# Patient Record
Sex: FEMALE | Race: WHITE
[De-identification: names, ages, dates, MRNs, and addresses within clinical notes are randomized per-mention and may not be internally consistent; named-entity substitution may affect disease eponyms.]

---

## 2018-06-19 ENCOUNTER — HOSPITAL ENCOUNTER (INPATIENT)
Dept: HOSPITAL 41 - JD.OB | Age: 33
LOS: 1 days | Discharge: HOME | End: 2018-06-20
Attending: OBSTETRICS & GYNECOLOGY | Admitting: OBSTETRICS & GYNECOLOGY
Payer: COMMERCIAL

## 2018-06-19 DIAGNOSIS — Z3A.38: ICD-10-CM

## 2018-06-19 DIAGNOSIS — Z88.8: ICD-10-CM

## 2018-06-19 DIAGNOSIS — O36.5930: Primary | ICD-10-CM

## 2018-06-19 DIAGNOSIS — J45.909: ICD-10-CM

## 2018-06-19 DIAGNOSIS — Z79.899: ICD-10-CM

## 2018-06-19 DIAGNOSIS — Z87.891: ICD-10-CM

## 2018-06-19 PROCEDURE — 6A550ZT PHERESIS OF CORD BLOOD STEM CELLS, SINGLE: ICD-10-PCS | Performed by: OBSTETRICS & GYNECOLOGY

## 2018-06-19 PROCEDURE — 10907ZC DRAINAGE OF AMNIOTIC FLUID, THERAPEUTIC FROM PRODUCTS OF CONCEPTION, VIA NATURAL OR ARTIFICIAL OPENING: ICD-10-PCS | Performed by: OBSTETRICS & GYNECOLOGY

## 2018-06-19 NOTE — PCM.DEL
L & D Note





- General Info


Date of Service: 18





- Delivery Note


Labor: Induced by ARM


Delivery Outcome: Livebirth


Infant Delivery Method: Spontaneous Vaginal Delivery-Single


Infant Delivery Mode: Spontaneous


Birth Presentation: Right Occiput Anterior (ERICK)


Nuchal Cord: None


Anesthesia Type: None


Amniotic Fluid Description: Clear


Episiotomy Type: None


Laceration: None


Placenta: Intact, Spontaneous


Cord: 3 Vessels


Estimated Blood Loss: 200


Resuscitation Needed: Yes


B[place]win: Bulb Syringe, Stimulated, Warmed, [place] Used


APGAR Score 1 min: 8


APGAR Score 5 min: 8


Delivery Comments (Free Text/Narrative):: 





Shortly after AROM patient found to be complete.  With maternal pushing effort 

fetal head delivered from an ERICK presentation.  No nuchal cord present.  With 

gentle downward traction the fetal shoulders and body delivered.  Infant placed 

on maternal abdomen.  Cord clamped and cut.  Cord blood obtained. Placenta 

allowed time to separate and expelled intact.  Inspection of the perineum 

showed no lacerations 





- General Info


Date of Service: 18





- Patient Data


Vitals - Most Recent: 


 Last Vital Signs











Temp  36.9 C   18 15:24


 


Pulse  86   18 15:30


 


Resp  18   18 15:24


 


BP  107/70   18 15:30


 


Pulse Ox  98   18 15:24











Weight - Most Recent: 70.307 kg


I&O - Last 24 Hours: 


 Intake & Output











 18





 06:59 14:59 22:59


 


Intake Total   0


 


Balance   0











Lab Results Last 24 Hours: 


 Laboratory Results - last 24 hr











  18 Range/Units





  15:20 15:20 15:58 


 


WBC  11.73 H    (3.98-10.04)  K/mm3


 


RBC  4.10    (3.98-5.22)  M/mm3


 


Hgb  12.3    (11.2-15.7)  gm/L


 


Hct  35.8    (34.1-44.9)  %


 


MCV  87.3    (79.4-94.8)  fl


 


MCH  30.0    (25.6-32.2)  pg


 


MCHC  34.4    (32.2-35.5)  g/dl


 


RDW Std Deviation  41.9    (36.4-46.3)  fL


 


Plt Count  201    (182-369)  K/mm3


 


MPV  11.5    (9.4-12.3)  fl


 


POC Glucose    106 H  ()  mg/dL


 


Blood Type   B POSITIVE   


 


Gel Antibody Screen   Negative   














  18 Range/Units





  18:38 


 


WBC   (3.98-10.04)  K/mm3


 


RBC   (3.98-5.22)  M/mm3


 


Hgb   (11.2-15.7)  gm/L


 


Hct   (34.1-44.9)  %


 


MCV   (79.4-94.8)  fl


 


MCH   (25.6-32.2)  pg


 


MCHC   (32.2-35.5)  g/dl


 


RDW Std Deviation   (36.4-46.3)  fL


 


Plt Count   (182-369)  K/mm3


 


MPV   (9.4-12.3)  fl


 


POC Glucose  93  ()  mg/dL


 


Blood Type   


 


Gel Antibody Screen   











Med Orders - Current: 


 Current Medications





Ampicillin Sodium 1 gm/ Sodium (Chloride)  100 mls @ 200 mls/hr IV Q4H KESHAV


   Last Admin: 18 19:51 Dose:  200 mls/hr


Lactated Ringer's (Ringers, Lactated)  1,000 mls @ 40 mls/hr IV ASDIRECTED KESHAV


   Last Admin: 18 15:55 Dose:  40 mls/hr


Oxytocin/Lactated Ringer's (Pitocin In Lr 10 Units/1,000 Ml)  10 unit in 1,000 

mls @ 12 mls/hr IV TITRATE KESHAV; Protocol


Oxytocin/Lactated Ringer's (Pitocin In Lr 10 Units/1,000 Ml)  10 unit in 1,000 

mls @ 500 mls/hr IV .CONTINUOUS KESHAV


   Last Admin: 18 20:10 Dose:  500 mls/hr


Nalbuphine HCl (Nubain)  10 mg IVPUSH Q2H PRN


   PRN Reason: Pain (moderate 4-6)


Ondansetron HCl (Zofran)  4 mg IVPUSH Q4H PRN


   PRN Reason: Nausea/Vomiting


Sodium Chloride (Saline Flush)  10 ml FLUSH ASDIRECTED PRN


   PRN Reason: Keep Vein Open





Discontinued Medications





Ampicillin Sodium 2 gm/ Sodium (Chloride)  100 mls @ 200 mls/hr IV ONETIME ONE


   Stop: 18 15:35


   Last Admin: 18 15:55 Dose:  200 mls/hr


Lidocaine HCl (Xylocaine 1%) Confirm Administered Dose 50 ml .ROUTE .STK-MED ONE


   Stop: 18 20:03











- Problem List Review


Problem List Initiated/Reviewed/Updated: Yes





- My Orders


Last 24 Hours: 


My Active Orders





18 15:06


Patient Status [ADT] Routine 


Activity as Tolerated [RC] PFP 


Blood Glucose Check, Bedside [RC] Q2H 


Communication Order [RC] ASDIRECTED 


Fetal Monitoring [RC] INTERMITTENT 


Notify Provider [RC] ASDIRECTED 


Notify Provider [RC] PRN 


Vaginal Exam [RC] ASDIRECTED 


Nalbuphine [Nubain]   10 mg IVPUSH Q2H PRN 


Ondansetron [Zofran]   4 mg IVPUSH Q4H PRN 


Sodium Chloride 0.9% [Saline Flush]   10 ml FLUSH ASDIRECTED PRN 


Electronic Fetal Heart Tones Ext w TOCO [WOMSER] Routine 


Peripheral IV Insertion Adult [OM.PC] Routine 





18 15:07


Fetal Heart Tones [RC] ASDIRECTED 


Peripheral IV Care [RC] .AS DIRECTED 





18 15:15


Lactated Ringers [Ringers, Lactated] 1,000 ml IV ASDIRECTED 


Oxytocin/Lactated Ringers [Pitocin in LR 10 Units/1,000 ML] 10 unit in 1,000 ml 

IV .CONTINUOUS 


Oxytocin/Lactated Ringers [Pitocin in LR 10 Units/1,000 ML] 10 unit in 1,000 ml 

IV TITRATE 





18 15:20


RAPID PLASMA REAGIN,RPR [CHEM] Routine 





18 19:00


Ampicillin 1 gm   Sodium Chloride 0.9% [Normal Saline] 100 ml IV Q4H 





18 Dinner


Regular Diet [DIET] 














- Assessment


Assessment:: 





33 y/o G3 now  PPD#0 from  at 38 3/7 wks 





- Plan


Plan:: 











* Blood glucose in AM 


* Routine cares 


* Bottle feeding 


* Discharge home in 1-2 days

## 2018-06-19 NOTE — PCM.LDHP
L&D History of Present Illness





- General


Date of Service: 18


Admit Problem/Dx: 


 Patient Status Order with Admit Dx/Problem





18 15:06


Patient Status [ADT] Routine 








 Admission Diagnosis/Problem











Admission Diagnosis/Problem    High risk pregnancy














Source of Information: Patient


History Limitations: Reports: No Limitations





- History of Present Illness


Introduction:: 





Patient is a 31 y/o  woman at 38 3/7 wks who presents for IOL for IUGR 

and GODMA2.  Doing well since last seen in clinic.  Having some contractions 

today.  Rates them as a 4/10.  Reports blood sugars have been mostly good, but 

does not have them for review 





- Related Data


Allergies/Adverse Reactions: 


 Allergies











Allergy/AdvReac Type Severity Reaction Status Date / Time


 


benzoyl peroxide Allergy  Hives Verified 18 16:31


 


red dye Allergy  Hives Verified 18 15:26











Home Medications: 


 Home Meds





Acetaminophen [Tylenol] 1,000 mg PO Q4H PRN 18 [History]


Acyclovir [Zovirax 5% Oint] 0 gm TOP QID PRN 18 [History]


Albuterol [Ventolin HFA] 2 puff INH Q4H PRN 18 [History]


Cetirizine HCl [Zyrtec] 20 mg PO DAILY PRN 18 [History]


Insulin Detemir [Levemir] 10 unit SUBCUT BEDTIME 18 [History]


Insulin Lispro [Humalog] 5 unit SQ ASDIRECTED 18 [History]


valACYclovir HCl [Valtrex] 500 mg PO DAILY PRN 18 [History]











Past Medical History


Respiratory History: Reports: Asthma


Gastrointestinal History: Reports: GERD


OB/GYN History: Reports: Pregnancy


: 3


Para: 2


LMP (Approximate): Pregnant


Endocrine/Metabolic History: Reports: Diabetes, Gestational





- Past Surgical History


HEENT Surgical History: Reports: Naso-Sinus Surgery





Social & Family History





- Tobacco Use


Smoking Status *Q: Former Smoker





- Alcohol Use


Alcohol Use History: No





- Recreational Drug Use


Recreational Drug Use: No





H&P Review of Systems





- Review of Systems:


Review Of Systems: See Below


General: Reports: No Symptoms


Pulmonary: Reports: No Symptoms


Cardiovascular: Reports: No Symptoms


Gastrointestinal: Reports: No Symptoms


Genitourinary: Reports: No Symptoms


Musculoskeletal: Reports: No Symptoms


Neurological: Reports: No Symptoms





L&D Exam





- Exam


Exam: See Below





- OB Specific


Contraction Intensity: Mild to Moderate


Fetal Movement: Active


Fetal Heart Tones: Present


Fetal Heart Tones per Min: 135


Fetal Heart Rate (FHR) Variability: Moderate (6-25 bmp)


Birth Presentation: Vertex





- Veliz Score


Veliz Score Cervix Position: Midposition


Veliz Score Consistency: Soft


Veliz Score Effacement: >80%


Veliz Score Dilation: > 5 cm


Veliz Score Infant's Station: -2


Veliz Score Total: 10





- Exam


General: Alert, Oriented, Cooperative


Lungs: Clear to Auscultation, Normal Respiratory Effort


Cardiovascular: Regular Rate, Regular Rhythm


GI/Abdominal Exam: Soft, Non-Tender


Genitourinary: Normal external exam


Extremities: Normal Inspection


Skin: Warm, Dry, Intact





- Patient Data


Result Diagrams: 


 18 15:20








- Problem List


(1) 38 weeks gestation of pregnancy


SNOMED Code(s): 56176513


   ICD Code: Z3A.38 - 38 WEEKS GESTATION OF PREGNANCY   Status: Acute   Current 

Visit: Yes   





(2) IUGR (intrauterine growth restriction)


SNOMED Code(s): 97588894


   ICD Code: OEG8946 -    Status: Acute   Current Visit: Yes   





(3) Gestational diabetes


SNOMED Code(s): 77858133


   ICD Code: O24.419 - GESTATIONAL DIABETES MELLITUS IN PREGNANCY, UNSP CONTROL

   Status: Acute   Current Visit: Yes   


Qualifiers: 


   Gestational diabetes mellitus control: insulin-controlled   Trimester: third 

trimester   Qualified Code(s): O24.414 - Gestational diabetes mellitus in 

pregnancy, insulin controlled   





(4) GBS carrier


SNOMED Code(s): 2959909526521


   ICD Code: Z22.330 - CARRIER OF GROUP B STREPTOCOCCUS   Status: Acute   

Current Visit: Yes   


Problem List Initiated/Reviewed/Updated: Yes


Orders Last 24hrs: 


 Active Orders 24 hr











 Category Date Time Status


 


 Patient Status [ADT] Routine ADT  18 15:06 Ordered


 


 Activity as Tolerated [RC] PFP Care  18 15:06 Ordered


 


 Blood Glucose Check, Bedside [RC] Q2H Care  18 15:06 Ordered


 


 Communication Order [RC] ASDIRECTED Care  18 15:06 Ordered


 


 Communication Order [RC] ASDIRECTED Care  18 15:06 Ordered


 


 Communication Order [RC] ASDIRECTED Care  18 15:06 Ordered


 


 Fetal Heart Tones [RC] ASDIRECTED Care  18 15:07 Ordered


 


 Fetal Monitoring [RC] INTERMITTENT Care  18 15:06 Ordered


 


 Notify Provider [RC] ASDIRECTED Care  18 15:06 Ordered


 


 Notify Provider [RC] PRN Care  18 15:06 Ordered


 


 Peripheral IV Care [RC] .AS DIRECTED Care  18 15:07 Ordered


 


 Vaginal Exam [RC] ASDIRECTED Care  18 15:06 Ordered


 


 Vital Signs [RC] ASDIRECTED Care  18 15:06 Ordered


 


 Vital Signs [RC] PER UNIT ROUTINE Care  18 15:06 Ordered


 


 Regular Diet [DIET] Diet  18 Dinner Ordered


 


 CBC W/O DIFF,HEMOGRAM [HEME] Routine Lab  18 15:06 Ordered


 


 RAPID PLASMA REAGIN,RPR [CHEM] Routine Lab  18 15:06 Ordered


 


 TYPE AND SCREEN [BBK] Routine Lab  18 15:06 Ordered


 


 Ampicillin 1 gm Med  18 15:15 Ordered





 Sodium Chloride 0.9% [Normal Saline] 100 ml   





 IV Q4H   


 


 Ampicillin 2 gm Med  18 15:06 Ordered





 Sodium Chloride 0.9% [Normal Saline] 100 ml   





 IV ONETIME   


 


 Lactated Ringers [Ringers, Lactated] 1,000 ml Med  18 15:15 Ordered





 IV ASDIRECTED   


 


 Nalbuphine [Nubain] Med  18 15:06 Ordered





 10 mg IVPUSH Q2H PRN   


 


 Ondansetron [Zofran] Med  18 15:06 Ordered





 4 mg IVPUSH Q4H PRN   


 


 Oxytocin/Lactated Ringers [Pitocin in LR 10 Units/1,000 Med  18 15:15 

Ordered





 ML]   





 10 unit in 1,000 ml IV .CONTINUOUS   


 


 Oxytocin/Lactated Ringers [Pitocin in LR 10 Units/1,000 Med  18 15:15 

Ordered





 ML]   





 10 unit in 1,000 ml IV TITRATE   


 


 Sodium Chloride 0.9% [Saline Flush] Med  18 15:06 Ordered





 10 ml FLUSH ASDIRECTED PRN   


 


 Electronic Fetal Heart Tones Ext w TOCO [WOMSER] Oth  18 15:06 Ordered





 Routine   


 


 Peripheral IV Insertion Adult [OM.PC] Routine Oth  18 15:06 Ordered











Assessment/Plan Comment:: 





31 y/o  at 38 3/7 wks 





* CBC and T&S


* Blood sugars q2 hrs 


* Given already 4-5 cm (likely early labor) will only start Ampicillin for GBS 

prophylaxis and then AROM after 2nd dose. 


* Pain management per patient preference 


* Anticipate

## 2018-06-20 NOTE — PCM.PNPP
- General Info


Date of Service: 18


Functional Status: Reports: Pain Controlled, Tolerating Diet, Ambulating, 

Urinating





- Review of Systems


General: Reports: No Symptoms


Pulmonary: Reports: No Symptoms


Cardiovascular: Reports: No Symptoms


Gastrointestinal: Reports: No Symptoms


Genitourinary: Reports: No Symptoms


Musculoskeletal: Reports: No Symptoms


Neurological: Reports: No Symptoms





- Patient Data


Vital Signs - Most Recent: 


 Last Vital Signs











Temp  36.9 C   18 15:24


 


Pulse  86   18 15:30


 


Resp  18   18 15:24


 


BP  107/70   18 15:30


 


Pulse Ox  98   18 15:24











Weight - Most Recent: 70.307 kg


I&O - Last 24 Hours: 


 Intake & Output











 18





 14:59 22:59 06:59


 


Intake Total  0 


 


Balance  0 











Lab Results - Last 24 Hours: 


 Laboratory Results - last 24 hr











  18 Range/Units





  15:20 15:20 15:58 


 


WBC  11.73 H    (3.98-10.04)  K/mm3


 


RBC  4.10    (3.98-5.22)  M/mm3


 


Hgb  12.3    (11.2-15.7)  gm/L


 


Hct  35.8    (34.1-44.9)  %


 


MCV  87.3    (79.4-94.8)  fl


 


MCH  30.0    (25.6-32.2)  pg


 


MCHC  34.4    (32.2-35.5)  g/dl


 


RDW Std Deviation  41.9    (36.4-46.3)  fL


 


Plt Count  201    (182-369)  K/mm3


 


MPV  11.5    (9.4-12.3)  fl


 


POC Glucose    106 H  ()  mg/dL


 


Blood Type   B POSITIVE   


 


Gel Antibody Screen   Negative   














  18 Range/Units





  18:38 


 


WBC   (3.98-10.04)  K/mm3


 


RBC   (3.98-5.22)  M/mm3


 


Hgb   (11.2-15.7)  gm/L


 


Hct   (34.1-44.9)  %


 


MCV   (79.4-94.8)  fl


 


MCH   (25.6-32.2)  pg


 


MCHC   (32.2-35.5)  g/dl


 


RDW Std Deviation   (36.4-46.3)  fL


 


Plt Count   (182-369)  K/mm3


 


MPV   (9.4-12.3)  fl


 


POC Glucose  93  ()  mg/dL


 


Blood Type   


 


Gel Antibody Screen   











Med Orders - Current: 


 Current Medications





Acetaminophen (Tylenol)  650 mg PO Q4H PRN


   PRN Reason: mild pain or fever


Benzocaine/Menthol (Dermoplast Pain Relief Spray)  0 gm TOP ASDIRECTED PRN


   PRN Reason: Perineal Comfort Measure


Docusate Sodium (Colace)  100 mg PO BID PRN


   PRN Reason: Constipation


Emollient Ointment (Lansinoh Hpa)  0 gm TOP ASDIRECTED PRN


   PRN Reason: Sore Nipples


Ibuprofen (Motrin)  600 mg PO Q6H PRN


   PRN Reason: Mild pain or fever


   Last Admin: 18 21:00 Dose:  600 mg


Witch Hazel (Tucks)  1 pad TOP ASDIRECTED PRN


   PRN Reason: Hemorrhoid pain





Discontinued Medications





Ampicillin Sodium 2 gm/ Sodium (Chloride)  100 mls @ 200 mls/hr IV ONETIME ONE


   Stop: 18 15:35


   Last Admin: 18 15:55 Dose:  200 mls/hr


Ampicillin Sodium 1 gm/ Sodium (Chloride)  100 mls @ 200 mls/hr IV Q4H KESHAV


   Last Admin: 18 19:51 Dose:  200 mls/hr


Lactated Ringer's (Ringers, Lactated)  1,000 mls @ 40 mls/hr IV ASDIRECTED KESHAV


   Last Admin: 18 15:55 Dose:  40 mls/hr


Oxytocin/Lactated Ringer's (Pitocin In Lr 10 Units/1,000 Ml)  10 unit in 1,000 

mls @ 12 mls/hr IV TITRATE KESHAV; Protocol


Oxytocin/Lactated Ringer's (Pitocin In Lr 10 Units/1,000 Ml)  10 unit in 1,000 

mls @ 500 mls/hr IV .CONTINUOUS KESHAV


   Last Admin: 18 20:10 Dose:  500 mls/hr


Lidocaine HCl (Xylocaine 1%) Confirm Administered Dose 50 ml .ROUTE .STK-MED ONE


   Stop: 18 20:03


Nalbuphine HCl (Nubain)  10 mg IVPUSH Q2H PRN


   PRN Reason: Pain (moderate 4-6)


Ondansetron HCl (Zofran)  4 mg IVPUSH Q4H PRN


   PRN Reason: Nausea/Vomiting


Sodium Chloride (Saline Flush)  10 ml FLUSH ASDIRECTED PRN


   PRN Reason: Keep Vein Open











- Infant Interaction


Infant Disposition, Postpartum:  in Room with Family


Infant Interaction: Holding Infant


Infant Feeding: Bottle Fed Infant


Support Person: 





- Postpartum Recovery Exam


Fundal Tone: Firm


Fundal Level: At Umbilicus


Fundal Placement: Midline


Lochia Amount: Scant


Lochia Color: Rubra/Red


Perineum Description: Intact, Minimal Bruising/Swelling


Episiotomy/Laceration: None


Bladder Status: Voiding


Urinary Elimination: Voided





- Exam


General: Alert, Oriented, Cooperative


GI/Abdominal Exam: Soft, Non-Tender


Extremities: Normal Inspection


Skin: Warm, Dry, Intact





- Problem List Review


Problem List Initiated/Reviewed/Updated: Yes





- My Orders


Last 24 Hours: 


My Active Orders





18 15:06


Fetal Monitoring [RC] INTERMITTENT 


Vaginal Exam [RC] ASDIRECTED 





18 15:07


Fetal Heart Tones [RC] ASDIRECTED 





18 15:20


RAPID PLASMA REAGIN,RPR [CHEM] Routine 





18 20:52


Activity as Tolerated [RC] PER UNIT ROUTINE 


Blood Glucose Check, Bedside [RC] AMPROC 


Vital Signs [RC] 03,09,15,21 


Acetaminophen [Tylenol]   650 mg PO Q4H PRN 


Benzocaine/Menthol [Dermoplast Pain Relief Spray]   See Dose Instructions  TOP 

ASDIRECTED PRN 


Docusate Sodium [Colace]   100 mg PO BID PRN 


Ibuprofen [Motrin]   600 mg PO Q6H PRN 


Lanolin [Lansinoh HPA]   See Dose Instructions  TOP ASDIRECTED PRN 


Witch Hazel [Tucks]   1 pad TOP ASDIRECTED PRN 


Assess Lochia [WOMSER] Per Unit Routine 


Assess Uterine Involution [WOMSER] Per Unit Routine 


Breast Pump [WOMSER] Per Unit Routine 


Heat Therapy [OM.PC] PRN 


Ice Therapy [OM.PC] Per Unit Routine 


Perineal Care [OM.PC] Per Unit Routine 


Peripheral IV Discontinue [OM.PC] Routine 


Sitz Bath [OM.PC] Per Unit Routine 





18 Dinner


Regular Diet [DIET] 





18 20:52


Heat Therapy [OM.PC] PRN 














- Assessment


Assessment:: 





31 y/o G3 now  PPD#1 from  at 38 3/7 wks 





- Plan


Plan:: 











* Blood glucose to be done this AM, 2hr GTT at postpartum check  


* Routine cares 


* Bottle feeding 


* Discharge home today

## 2018-06-20 NOTE — PCM.DCSUM1
**Discharge Summary





- Discharge Data


Discharge Date: 18


Discharge Disposition: Home, Self-Care 01


Condition: Good





- Discharge Diagnosis/Problem(s)


(1) 38 weeks gestation of pregnancy


SNOMED Code(s): 96420913


   ICD Code: Z3A.38 - 38 WEEKS GESTATION OF PREGNANCY   Status: Acute   





(2) GBS carrier


SNOMED Code(s): 6183551479426


   ICD Code: Z22.330 - CARRIER OF GROUP B STREPTOCOCCUS   Status: Acute   





(3) Gestational diabetes


SNOMED Code(s): 15308658


   ICD Code: O24.419 - GESTATIONAL DIABETES MELLITUS IN PREGNANCY, UNSP CONTROL

   Status: Acute   


Qualifiers: 


   Gestational diabetes mellitus control: insulin-controlled   Trimester: third 

trimester   Qualified Code(s): O24.414 - Gestational diabetes mellitus in 

pregnancy, insulin controlled   





(4) IUGR (intrauterine growth restriction)


SNOMED Code(s): 88038652


   ICD Code: HDX5279 -    Status: Acute   





(5) Vaginal delivery


SNOMED Code(s): 479362868


   ICD Code: O80 - ENCOUNTER FOR FULL-TERM UNCOMPLICATED DELIVERY   Status: 

Acute   





- Patient Summary/Data


Complications: None


Consults: 


None


Recommended Follow-up Testing/Procedures: 


Follow up in 5 weeks with postpartum check and 2hr GTT


Hospital Course: 


Patient is a 32-year-old  who presented at 38-3/7 weeks gestation for 

induction of labor for IUGR gestational diabetes.  When she presented was found 

to likely be in latent labor.  Her antibiotics were started for GBS prophylaxis 

and after her second dose her water was artificially ruptured.  She progressed 

rapidly to complete dilation and underwent an uncomplicated vaginal delivery.  

See delivery note for full details.  Postpartum she did well with appropriate 

fasting blood sugar.  Insulin was discontinued.  She was discharged home on 

postpartum day 1 with plans to follow up in clinic in 5 weeks for postpartum 

check and 2hr glucose testing





- Patient Instructions


Diet: Regular Diet as Tolerated


Activity: As Tolerated


Activity, Other: Pelvic Rest for 6 weeks 


Driving: May Drive Today


Showering/Bathing: May Shower


Showering/Bathing, Other: May Bathe 


Notify Provider of: Fever, Increased Pain, Swelling and Redness, Drainage





- Discharge Plan


Home Medications: 


 Home Meds





Albuterol [Ventolin HFA] 2 puff INH Q4H PRN 18 [History]


Docusate Sodium [Colace] 100 mg PO BID PRN  cap 18 [Rx]


Ibuprofen [Motrin] 600 mg PO Q6H PRN  tablet 18 [Rx]








Patient Handouts:  Vaginal Delivery, Care After


Referrals: 


Tila Ordaz MD [Physician] -  (5 weeks for postpartum check )





- Discharge Summary/Plan Comment


DC Time >30 min.: No





- Patient Data


Vitals - Most Recent: 


 Last Vital Signs











Temp  36.9 C   18 07:35


 


Pulse  69   18 14:43


 


Resp  18   18 07:35


 


BP  112/76   18 14:43


 


Pulse Ox  99   18 14:43











Weight - Most Recent: 70.307 kg


I&O - Last 24 hours: 


 Intake & Output











 18





 06:59 14:59 22:59


 


Intake Total  240 


 


Balance  240 











Lab Results - Last 24 hrs: 


 Laboratory Results - last 24 hr











  18 Range/Units





  15:20 18:38 07:15 


 


POC Glucose   93  82  ()  mg/dL


 


RPR  Non-reactive    (NONREACTIVE)  











Med Orders - Current: 


 Current Medications





Acetaminophen (Tylenol)  650 mg PO Q4H PRN


   PRN Reason: mild pain or fever


Benzocaine/Menthol (Dermoplast Pain Relief Spray)  0 gm TOP ASDIRECTED PRN


   PRN Reason: Perineal Comfort Measure


Docusate Sodium (Colace)  100 mg PO BID PRN


   PRN Reason: Constipation


Emollient Ointment (Lansinoh Hpa)  0 gm TOP ASDIRECTED PRN


   PRN Reason: Sore Nipples


Ibuprofen (Motrin)  600 mg PO Q6H PRN


   PRN Reason: Mild pain or fever


   Last Admin: 18 14:46 Dose:  600 mg


Witch Hazel (Tucks)  1 pad TOP ASDIRECTED PRN


   PRN Reason: Hemorrhoid pain





Discontinued Medications





Ampicillin Sodium 2 gm/ Sodium (Chloride)  100 mls @ 200 mls/hr IV ONETIME ONE


   Stop: 18 15:35


   Last Admin: 18 15:55 Dose:  200 mls/hr


Ampicillin Sodium 1 gm/ Sodium (Chloride)  100 mls @ 200 mls/hr IV Q4H KESHAV


   Last Admin: 18 19:51 Dose:  200 mls/hr


Lactated Ringer's (Ringers, Lactated)  1,000 mls @ 40 mls/hr IV ASDIRECTED KESHAV


   Last Admin: 18 15:55 Dose:  40 mls/hr


Oxytocin/Lactated Ringer's (Pitocin In Lr 10 Units/1,000 Ml)  10 unit in 1,000 

mls @ 12 mls/hr IV TITRATE KESHAV; Protocol


Oxytocin/Lactated Ringer's (Pitocin In Lr 10 Units/1,000 Ml)  10 unit in 1,000 

mls @ 500 mls/hr IV .CONTINUOUS KESHAV


   Last Admin: 18 20:10 Dose:  500 mls/hr


Lidocaine HCl (Xylocaine 1%) Confirm Administered Dose 50 ml .ROUTE .STK-MED ONE


   Stop: 18 20:03


   Last Admin: 18 12:11 Dose:  Not Given


Nalbuphine HCl (Nubain)  10 mg IVPUSH Q2H PRN


   PRN Reason: Pain (moderate 4-6)


Ondansetron HCl (Zofran)  4 mg IVPUSH Q4H PRN


   PRN Reason: Nausea/Vomiting


Sodium Chloride (Saline Flush)  10 ml FLUSH ASDIRECTED PRN


   PRN Reason: Keep Vein Open